# Patient Record
Sex: FEMALE | Race: WHITE | NOT HISPANIC OR LATINO | Employment: FULL TIME | URBAN - METROPOLITAN AREA
[De-identification: names, ages, dates, MRNs, and addresses within clinical notes are randomized per-mention and may not be internally consistent; named-entity substitution may affect disease eponyms.]

---

## 2018-04-06 PROBLEM — Z80.0 FAMILY HX OF COLON CANCER: Status: ACTIVE | Noted: 2018-04-06

## 2018-04-11 PROBLEM — C80.1 ADENOCARCINOMA: Status: ACTIVE | Noted: 2018-04-11

## 2018-04-17 PROBLEM — C18.9 COLON CANCER: Status: ACTIVE | Noted: 2018-04-17

## 2018-05-02 PROBLEM — C80.1 ADENOCARCINOMA: Status: RESOLVED | Noted: 2018-04-11 | Resolved: 2018-05-02

## 2018-05-02 PROBLEM — C18.3 MALIGNANT NEOPLASM OF HEPATIC FLEXURE: Status: ACTIVE | Noted: 2018-05-02

## 2018-08-06 ENCOUNTER — NURSE TRIAGE (OUTPATIENT)
Dept: ADMINISTRATIVE | Facility: CLINIC | Age: 48
End: 2018-08-06

## 2018-08-07 NOTE — TELEPHONE ENCOUNTER
Received call back from house supervisor Darlin- advised to call Amelia with infusion- contacted Amelia and explained situation- between calls to amelia and pt's daughter Marie determined the lights blinking were green/yellow and not infusing- while amelia checking pt's chart I again spoke with Marie who advised they had a mgs low battery so they changed the battery - now light green and pump rebooting - pt told her the screen shot is what happens normally when turning it on. Determined per Marie at end of conversation that the screen had read 2.10 ml/hr vs 210 ml/hr. Also had PCS 6251G . Was advised by Amelia to have them contact infusion room in am as needed for any problems and can come in for evaluation of pump as needed. Marie was advised.

## 2018-08-07 NOTE — TELEPHONE ENCOUNTER
Daughter calling for pt who is getting chemo infusion. Reported her pump started blinking red/yellow and beeped. Has done this time 3 about q 5 min apart. She couldn't find any advice on trouble shooting this problem in the book with the pump. Has checked and finds no kinks in line. --    Called Jessica - floor nurse not able to advise-referred to house supervisor. She wasn't familiar with pump but is going to attempt to find someone who can answer question. Advised caller I would call back as soon as I heard from the supervisor.

## 2019-04-15 PROBLEM — D50.9 IRON DEFICIENCY ANEMIA: Status: ACTIVE | Noted: 2019-04-15

## 2019-04-15 PROBLEM — G62.0 CHEMOTHERAPY-INDUCED NEUROPATHY: Status: ACTIVE | Noted: 2019-04-15

## 2019-04-15 PROBLEM — Z95.828 PORT-A-CATH IN PLACE: Status: ACTIVE | Noted: 2019-04-15

## 2019-04-15 PROBLEM — T45.1X5A CHEMOTHERAPY-INDUCED NEUROPATHY: Status: ACTIVE | Noted: 2019-04-15

## 2019-05-10 PROBLEM — Z86.010 HISTORY OF COLON POLYPS: Status: ACTIVE | Noted: 2019-05-10

## 2019-08-26 PROBLEM — Z98.890 STATUS POST HYSTEROSCOPY: Status: ACTIVE | Noted: 2019-08-26

## 2019-08-26 PROBLEM — N93.9 ABNORMAL UTERINE BLEEDING (AUB): Status: ACTIVE | Noted: 2019-08-26

## 2019-11-05 PROBLEM — Z95.828 PORT-A-CATH IN PLACE: Status: RESOLVED | Noted: 2019-04-15 | Resolved: 2019-11-05

## 2020-01-13 PROBLEM — Z90.49 S/P RIGHT HEMICOLECTOMY: Status: ACTIVE | Noted: 2020-01-13

## 2020-01-13 PROBLEM — Z92.21 S/P CHEMOTHERAPY, TIME SINCE GREATER THAN 12 WEEKS: Status: ACTIVE | Noted: 2020-01-13

## 2020-07-28 ENCOUNTER — TELEPHONE (OUTPATIENT)
Dept: ADMINISTRATIVE | Facility: HOSPITAL | Age: 50
End: 2020-07-28

## 2020-07-28 ENCOUNTER — PATIENT OUTREACH (OUTPATIENT)
Dept: ADMINISTRATIVE | Facility: HOSPITAL | Age: 50
End: 2020-07-28

## 2020-07-28 DIAGNOSIS — Z12.31 ENCOUNTER FOR SCREENING MAMMOGRAM FOR BREAST CANCER: Primary | ICD-10-CM

## 2021-04-30 ENCOUNTER — TELEPHONE (OUTPATIENT)
Dept: ADMINISTRATIVE | Facility: HOSPITAL | Age: 51
End: 2021-04-30

## 2021-05-06 ENCOUNTER — PATIENT MESSAGE (OUTPATIENT)
Dept: RESEARCH | Facility: HOSPITAL | Age: 51
End: 2021-05-06

## 2021-08-02 PROBLEM — K62.5 RECTAL BLEEDING: Status: ACTIVE | Noted: 2021-08-02

## 2021-12-14 PROBLEM — N93.9 ABNORMAL UTERINE BLEEDING (AUB): Status: RESOLVED | Noted: 2019-08-26 | Resolved: 2021-12-14

## 2023-08-01 ENCOUNTER — PATIENT OUTREACH (OUTPATIENT)
Dept: ADMINISTRATIVE | Facility: HOSPITAL | Age: 53
End: 2023-08-01
Payer: MEDICAID

## 2023-08-01 DIAGNOSIS — Z12.31 ENCOUNTER FOR SCREENING MAMMOGRAM FOR BREAST CANCER: Primary | ICD-10-CM
